# Patient Record
Sex: FEMALE | Race: WHITE | NOT HISPANIC OR LATINO | Employment: FULL TIME | URBAN - METROPOLITAN AREA
[De-identification: names, ages, dates, MRNs, and addresses within clinical notes are randomized per-mention and may not be internally consistent; named-entity substitution may affect disease eponyms.]

---

## 2022-07-28 PROBLEM — S73.191A ACETABULAR LABRUM TEAR, RIGHT, INITIAL ENCOUNTER: Status: ACTIVE | Noted: 2022-07-28

## 2022-08-22 PROBLEM — M54.16 LUMBAR RADICULOPATHY: Status: ACTIVE | Noted: 2022-08-22

## 2022-09-07 ENCOUNTER — OFFICE VISIT (OUTPATIENT)
Dept: OBSTETRICS AND GYNECOLOGY | Facility: CLINIC | Age: 52
End: 2022-09-07
Payer: COMMERCIAL

## 2022-09-07 VITALS
WEIGHT: 127.75 LBS | HEART RATE: 91 BPM | BODY MASS INDEX: 22.64 KG/M2 | DIASTOLIC BLOOD PRESSURE: 84 MMHG | HEIGHT: 63 IN | SYSTOLIC BLOOD PRESSURE: 149 MMHG

## 2022-09-07 DIAGNOSIS — Z01.419 ROUTINE GYNECOLOGICAL EXAMINATION: Primary | ICD-10-CM

## 2022-09-07 DIAGNOSIS — R23.2 HOT FLASHES: ICD-10-CM

## 2022-09-07 PROCEDURE — 99386 PREV VISIT NEW AGE 40-64: CPT | Mod: S$GLB,,, | Performed by: OBSTETRICS & GYNECOLOGY

## 2022-09-07 PROCEDURE — 99386 PR PREVENTIVE VISIT,NEW,40-64: ICD-10-PCS | Mod: S$GLB,,, | Performed by: OBSTETRICS & GYNECOLOGY

## 2022-09-07 RX ORDER — ESTRADIOL 0.1 MG/D
1 FILM, EXTENDED RELEASE TRANSDERMAL
Qty: 24 PATCH | Refills: 4 | Status: SHIPPED | OUTPATIENT
Start: 2022-09-08 | End: 2023-01-25 | Stop reason: SDUPTHER

## 2022-09-07 RX ORDER — PROGESTERONE 100 MG/1
100 CAPSULE ORAL NIGHTLY
Qty: 12 CAPSULE | Refills: 11 | Status: SHIPPED | OUTPATIENT
Start: 2022-09-07 | End: 2022-09-23 | Stop reason: SDUPTHER

## 2022-09-07 NOTE — PROGRESS NOTES
Answers submitted by the patient for this visit:  Gynecologic Exam Questionnaire  (Submitted on 2022)  Chief Complaint: Gynecologic exam  genital itching: No  genital lesions: No  genital odor: No  genital rash: No  missed menses: No  pelvic pain: No  vaginal bleeding: No  vaginal discharge: No  Pregnant now?: No  abdominal pain: No  anorexia: No  back pain: No  chills: No  constipation: No  diarrhea: No  discolored urine: No  dysuria: No  fever: No  flank pain: No  frequency: No  headaches: No  hematuria: No  nausea: No  painful intercourse: No  rash: No  urgency: No  vomiting: No  Vaginal bleeding: no bleeding  Passing clots?: No  Passing tissue?: No  Sexual activity: sexually active  Partner with STD symptoms: no  Menstrual history: postmenopausal  STD: No  abdominal surgery: Yes   section: No  Ectopic pregnancy: No  Endometriosis: Yes  herpes simplex: No  gynecological surgery: Yes  menorrhagia: Yes  metrorrhagia: No  miscarriage: Yes  ovarian cysts: Yes  perineal abscess: No  PID: Yes  terminated pregnancy: Yes  vaginosis: No

## 2022-09-07 NOTE — PROGRESS NOTES
Subjective:       Patient ID: Kassandra Crisostomo is a 52 y.o. female.    Chief Complaint:  Well Woman (Pt denies any complaints/)      History of Present Illness  Gynecologic Exam  The patient's pertinent negatives include no vaginal discharge. Pertinent negatives include no abdominal pain, back pain, constipation, diarrhea, dysuria, fever or headaches.   Patient with hot flashes through out the day and then at night.   Started having hot flashes 8 years ago at 44, after bilateral tubal ligation.  Patient has tried estradiol 0.05, then 0.075  Also medroxyprogesterone 5mg  Started bleeding on hormones  Having pain in joints as well.     GYN & OB History  No LMP recorded. Patient is postmenopausal.   Date of Last Pap: No result found    OB History    Para Term  AB Living   4 3           SAB IAB Ectopic Multiple Live Births                  # Outcome Date GA Lbr Jin/2nd Weight Sex Delivery Anes PTL Lv   4             3 Para            2 Para            1 Para                Review of Systems  Review of Systems   Constitutional:  Negative for activity change, appetite change, fatigue, fever and unexpected weight change.   HENT:  Negative for nasal congestion and tinnitus.    Eyes:  Negative for visual disturbance.   Respiratory:  Negative for cough and shortness of breath.    Cardiovascular:  Negative for chest pain and leg swelling.   Gastrointestinal:  Negative for abdominal pain, bloating, blood in stool, constipation and diarrhea.   Endocrine: Positive for hot flashes.   Genitourinary:  Positive for menstrual problem. Negative for bladder incontinence, decreased libido, dysuria, vaginal bleeding, vaginal discharge, vaginal pain, vaginal dryness and vaginal odor.   Musculoskeletal:  Negative for arthralgias, back pain and joint swelling.   Integumentary:  Negative for acne.   Neurological:  Negative for headaches.   Psychiatric/Behavioral:  Negative for depression and sleep disturbance. The patient is  not nervous/anxious.          Objective:    Physical Exam:   Constitutional: She is oriented to person, place, and time. Vital signs are normal. She appears well-developed and well-nourished. She is cooperative.      Neck: No thyroid mass and no thyromegaly present.    Cardiovascular:  Normal rate, regular rhythm and normal pulses.             Pulmonary/Chest: Effort normal. No respiratory distress. Chest wall is not dull to percussion. She exhibits no mass, no bony tenderness, no laceration, no crepitus, no edema, no deformity, no swelling and no retraction. Right breast exhibits no inverted nipple, no mass, no nipple discharge, no skin change, no tenderness, presence, no bleeding and no swelling. Left breast exhibits no inverted nipple, no mass, no nipple discharge, no skin change, no tenderness, presence, no bleeding and no swelling.        Abdominal: Soft. She exhibits no distension. There is no abdominal tenderness. No hernia.     Genitourinary:    Vagina, uterus and rectum normal.      Pelvic exam was performed with patient supine.   Labial bartholins normal.There is no rash, tenderness, lesion or injury on the right labia. There is no rash, tenderness, lesion or injury on the left labia. Cervix is normal. Right adnexum displays no mass, no tenderness and no fullness. Left adnexum displays no mass, no tenderness and no fullness. No  no vaginal discharge, rectocele, cystocele or unspecified prolapse of vaginal walls in the vagina.           Musculoskeletal: Moves all extremeties.       Neurological: She is alert and oriented to person, place, and time.    Skin: Skin is warm and dry. No rash noted.    Psychiatric: She has a normal mood and affect. Her speech is normal and behavior is normal. Judgment and thought content normal.        Assessment:     Menopause  Hot Flashes  Well Woman Exam        Plan:      Routine care   Plan increased doses of estrogen.  Patient had decreased approximately <5 cig/day.  Discussed increased risk with smoking use and use of estrogen. Patient is working diligently on smoking cessation.

## 2022-09-14 LAB — Lab: NORMAL

## 2022-09-23 ENCOUNTER — PATIENT MESSAGE (OUTPATIENT)
Dept: OBSTETRICS AND GYNECOLOGY | Facility: CLINIC | Age: 52
End: 2022-09-23
Payer: COMMERCIAL

## 2022-09-23 RX ORDER — PROGESTERONE 100 MG/1
100 CAPSULE ORAL NIGHTLY
Qty: 90 CAPSULE | Refills: 3 | Status: SHIPPED | OUTPATIENT
Start: 2022-09-23 | End: 2023-08-31 | Stop reason: SDUPTHER

## 2022-10-20 ENCOUNTER — OFFICE VISIT (OUTPATIENT)
Dept: CARDIOTHORACIC SURGERY | Facility: CLINIC | Age: 52
End: 2022-10-20
Payer: COMMERCIAL

## 2022-10-20 VITALS
BODY MASS INDEX: 22.5 KG/M2 | OXYGEN SATURATION: 97 % | WEIGHT: 127 LBS | HEIGHT: 63 IN | RESPIRATION RATE: 16 BRPM | DIASTOLIC BLOOD PRESSURE: 84 MMHG | HEART RATE: 87 BPM | SYSTOLIC BLOOD PRESSURE: 160 MMHG

## 2022-10-20 DIAGNOSIS — I74.5 ILIAC ARTERY OCCLUSION, RIGHT: Primary | ICD-10-CM

## 2022-10-20 PROCEDURE — 99205 OFFICE O/P NEW HI 60 MIN: CPT | Mod: S$GLB,,, | Performed by: SURGERY

## 2022-10-20 PROCEDURE — 99205 PR OFFICE/OUTPT VISIT, NEW, LEVL V, 60-74 MIN: ICD-10-PCS | Mod: S$GLB,,, | Performed by: SURGERY

## 2022-10-20 RX ORDER — LISINOPRIL AND HYDROCHLOROTHIAZIDE 12.5; 2 MG/1; MG/1
TABLET ORAL
COMMUNITY
Start: 2022-10-06

## 2022-10-20 RX ORDER — QUETIAPINE FUMARATE 50 MG/1
50 TABLET, FILM COATED ORAL
COMMUNITY
Start: 2022-09-27 | End: 2023-10-11

## 2022-10-20 RX ORDER — DEXTROAMPHETAMINE SACCHARATE, AMPHETAMINE ASPARTATE, DEXTROAMPHETAMINE SULFATE AND AMPHETAMINE SULFATE 3.75; 3.75; 3.75; 3.75 MG/1; MG/1; MG/1; MG/1
TABLET ORAL
COMMUNITY

## 2022-10-20 RX ORDER — BUDESONIDE, GLYCOPYRROLATE, AND FORMOTEROL FUMARATE 160; 9; 4.8 UG/1; UG/1; UG/1
AEROSOL, METERED RESPIRATORY (INHALATION)
COMMUNITY
Start: 2022-07-26 | End: 2023-10-11

## 2022-10-20 NOTE — PROGRESS NOTES
..  Subjective:      Patient ID: Kassandra Crisostomo is a 52 y.o. female who presents for evaluation of right lower extremity claudication. Referred from Dr. [facility]un[facility].     Chief Complaint:   Right lower extremity claudication     HPI  53 yo lady with hx of HTN and tobacco use presents with chronic right lower extremity claudication. She reports she feels a heaviness in her right leg after she walks approximately 200 yards. She denies any rest pain or wounds on the feet. She is still actively smoking.         Review of Systems   Cardiovascular:  Positive for claudication. Negative for chest pain.   Respiratory:  Negative for shortness of breath.     Past Medical History:   Diagnosis Date    Breast disorder 09/07/2021    Hormone disorder     Hypertension     Insomnia       Past Surgical History:   Procedure Laterality Date    APPENDECTOMY      RETINAL DETACHMENT SURGERY Bilateral 1991    salpingectomy Bilateral 2014      Family History   Problem Relation Age of Onset    Heart disease Mother     Diabetes Mellitus Mother       Social History     Socioeconomic History    Marital status:    Tobacco Use    Smoking status: Every Day     Packs/day: 0.25     Years: 30.00     Pack years: 7.50     Types: Cigarettes    Smokeless tobacco: Never   Substance and Sexual Activity    Alcohol use: Yes    Drug use: Never    Sexual activity: Yes     Partners: Male     Birth control/protection: See Surgical Hx        Medication List with Changes/Refills   Current Medications    BREZTRI AEROSPHERE 160-9-4.8 MCG/ACTUATION HFAA        DEXTROAMPHETAMINE-AMPHETAMINE (ADDERALL) 15 MG TABLET    Take by mouth. 1/2 tablet BID    ESTRADIOL (VIVELLE-DOT) 0.1 MG/24 HR PTSW    Place 1 patch onto the skin twice a week.    LISINOPRIL-HYDROCHLOROTHIAZIDE (PRINZIDE,ZESTORETIC) 20-12.5 MG PER TABLET        PROGESTERONE (PROMETRIUM) 100 MG CAPSULE    Take 1 capsule (100 mg total) by mouth nightly.    QUETIAPINE (SEROQUEL) 50 MG TABLET    50 mg.     "    Objective:     BP (!) 160/84   Pulse 87   Resp 16   Ht 5' 3" (1.6 m)   Wt 57.6 kg (127 lb)   SpO2 97%   BMI 22.50 kg/m²     Physical Exam  Constitutional:       Appearance: Normal appearance. She is normal weight.   Cardiovascular:      Rate and Rhythm: Normal rate and regular rhythm.      Comments: Monophasic R DP pulse  Multiphasic L DP pulse   Pulmonary:      Effort: Pulmonary effort is normal.   Abdominal:      General: Bowel sounds are normal. There is no distension.      Tenderness: There is no abdominal tenderness.        Labs:          Assessment & Plan:     53 yo F with right common iliac occlusion in the setting of right lower extremity claudication. Discussed with the patient the pathophysiology of claudication and that it is neither a life threatening nor limb threatening problem.     I discussed that there are three options of management of this - 1) Non-operative; optimize medical management - smoking cessation and anti-platelet therapy and walking problem - benefit of this approach is avoid the risks of major operation; downside is that this claudication will persist for a period of time until she develops collaterals furthermore it may worsen if she continues to smoke. 2) Endovascular - not ideal in someone of her young age and also given the significantly calcified lesion it will likely not be as amenable to stenting. 3) Open - ideal solution given lesion morphology and patient but would entail aorto-iliac endarterectomy. Discussed that it would be a major operation that would in total be an approximately 2 month recovery period with approximately 1 week hospital stay.     At this time, given that her  could be undergoing a kidney transplant soon, she would like to try non-operative management and see if she can have an improvement in her symptoms. This would enable her also to care for her  in the event that he goes for a kidney transplant soon.     Will plan to see the " patient back in 1 month with ABIs an Arterial Duplex.

## 2022-11-29 ENCOUNTER — OFFICE VISIT (OUTPATIENT)
Dept: CARDIOTHORACIC SURGERY | Facility: CLINIC | Age: 52
End: 2022-11-29
Payer: COMMERCIAL

## 2022-11-29 VITALS
HEART RATE: 79 BPM | DIASTOLIC BLOOD PRESSURE: 79 MMHG | HEIGHT: 63 IN | SYSTOLIC BLOOD PRESSURE: 121 MMHG | OXYGEN SATURATION: 96 % | RESPIRATION RATE: 16 BRPM | WEIGHT: 128.38 LBS | BODY MASS INDEX: 22.75 KG/M2

## 2022-11-29 DIAGNOSIS — F17.218 CIGARETTE NICOTINE DEPENDENCE WITH OTHER NICOTINE-INDUCED DISORDER: ICD-10-CM

## 2022-11-29 DIAGNOSIS — I73.9 PERIPHERAL ARTERIAL DISEASE: Primary | ICD-10-CM

## 2022-11-29 PROCEDURE — 99212 PR OFFICE/OUTPT VISIT, EST, LEVL II, 10-19 MIN: ICD-10-PCS | Mod: S$GLB,,, | Performed by: SURGERY

## 2022-11-29 PROCEDURE — 99212 OFFICE O/P EST SF 10 MIN: CPT | Mod: S$GLB,,, | Performed by: SURGERY

## 2022-11-30 NOTE — PROGRESS NOTES
"..  Subjective:      Patient ID: Kassandra Crisostomo is a 52 y.o. female who presents for evaluation of right lower extremity claudication in the setting of aorto-iliac disease.     Chief Complaint: Right lower extremity claudication.     HPI  53 yo F presents for evaluation of right lower extremity claudication in the setting of aorto-iliac disease. She reports right leg heaviness after walking approximately 3000 feet. She denies any rest pain .    ROS   Past Medical History:   Diagnosis Date    Breast disorder 09/07/2021    Emphysema lung     Hormone disorder     Hypertension     Insomnia       Past Surgical History:   Procedure Laterality Date    APPENDECTOMY      RETINAL DETACHMENT SURGERY Bilateral 1991    salpingectomy Bilateral 2014      Family History   Problem Relation Age of Onset    Heart disease Mother     Diabetes Mellitus Mother       Social History     Socioeconomic History    Marital status:    Tobacco Use    Smoking status: Every Day     Packs/day: 0.25     Years: 30.00     Pack years: 7.50     Types: Cigarettes    Smokeless tobacco: Never   Substance and Sexual Activity    Alcohol use: Yes    Drug use: Never    Sexual activity: Yes     Partners: Male     Birth control/protection: See Surgical Hx        Medication List with Changes/Refills   Current Medications    BREZTRI AEROSPHERE 160-9-4.8 MCG/ACTUATION HFAA        DEXTROAMPHETAMINE-AMPHETAMINE (ADDERALL) 15 MG TABLET    Take by mouth. 1/2 tablet BID    ESTRADIOL (VIVELLE-DOT) 0.1 MG/24 HR PTSW    Place 1 patch onto the skin twice a week.    LISINOPRIL-HYDROCHLOROTHIAZIDE (PRINZIDE,ZESTORETIC) 20-12.5 MG PER TABLET        PROGESTERONE (PROMETRIUM) 100 MG CAPSULE    Take 1 capsule (100 mg total) by mouth nightly.    QUETIAPINE (SEROQUEL) 50 MG TABLET    50 mg.        Objective:     /79 (BP Location: Left arm, Patient Position: Sitting)   Pulse 79   Resp 16   Ht 5' 3" (1.6 m)   Wt 58.2 kg (128 lb 6.4 oz)   SpO2 96%   BMI 22.75 kg/m² "   Gen: Well-appearing; no acute distress  Lungs: Unlabored respirations on room air  Cardio: Regular rate and rhythm     Lower extremities: Non-palpable R DP, PT signals; + doppler signals           Assessment & Plan:   51 yo F presents for evaluation of right lower extremity claudication in the setting of aorto-iliac disease. She reports right leg heaviness after walking approximately 3000 feet. She denies any rest pain.     - Continue with smoking cessation   - Referral to Ambulatory smoking services  - Continue best medical therapy - aspirin, statin; blood pressure management (SBP < 140; DBP < 80)  - Follow-up in last few weeks of January after determining kidney transplant status of her

## 2023-01-17 ENCOUNTER — CLINICAL SUPPORT (OUTPATIENT)
Dept: SMOKING CESSATION | Facility: CLINIC | Age: 53
End: 2023-01-17

## 2023-01-17 DIAGNOSIS — F17.210 MODERATE SMOKER (20 OR LESS PER DAY): Primary | ICD-10-CM

## 2023-01-17 PROCEDURE — 99404 PREV MED CNSL INDIV APPRX 60: CPT | Mod: 95,,,

## 2023-01-17 PROCEDURE — 99404 PR PREVENT COUNSEL,INDIV,60 MIN: ICD-10-PCS | Mod: 95,,,

## 2023-01-17 RX ORDER — IBUPROFEN 200 MG
1 TABLET ORAL DAILY
Qty: 14 PATCH | Refills: 0 | Status: SHIPPED | OUTPATIENT
Start: 2023-01-17 | End: 2023-01-17

## 2023-01-17 RX ORDER — DM/P-EPHED/ACETAMINOPH/DOXYLAM 30-7.5/3
LIQUID (ML) ORAL
Qty: 270 LOZENGE | Refills: 0 | Status: SHIPPED | OUTPATIENT
Start: 2023-01-17 | End: 2023-01-17

## 2023-01-17 RX ORDER — DM/P-EPHED/ACETAMINOPH/DOXYLAM 30-7.5/3
LIQUID (ML) ORAL
Qty: 270 LOZENGE | Refills: 0 | Status: SHIPPED | OUTPATIENT
Start: 2023-01-17 | End: 2023-01-24

## 2023-01-17 RX ORDER — IBUPROFEN 200 MG
1 TABLET ORAL DAILY
Qty: 14 PATCH | Refills: 0 | Status: SHIPPED | OUTPATIENT
Start: 2023-01-17 | End: 2023-01-24

## 2023-01-17 NOTE — Clinical Note
Patient currently smoking a pack per day and will begin 1:1 cessation therapy with CTTS. Patient will begin prescribed tobacco cessation medication regimen of 21mg nicotine patch, along with 2mg nicotine lozenge, daily as directed.

## 2023-01-18 NOTE — PROGRESS NOTES
Patient currently smoking a pack per day and will begin 1:1 cessation therapy with CTTS. Patient will begin prescribed tobacco cessation medication regimen of 21mg nicotine patch, along with 2mg nicotine lozenge, daily as directed.    The patient location is: [place], [place]  The chief complaint leading to consultation is: Nicotine dependence    Visit type: audiovisual    Face to Face time with patient: 46  30 minutes of total time spent on the encounter, which includes face to face time and non-face to face time preparing to see the patient (eg, review of tests), Obtaining and/or reviewing separately obtained history, Documenting clinical information in the electronic or other health record, Independently interpreting results (not separately reported) and communicating results to the patient/family/caregiver, or Care coordination (not separately reported).         Each patient to whom he or she provides medical services by telemedicine is:  (1) informed of the relationship between the physician and patient and the respective role of any other health care provider with respect to management of the patient; and (2) notified that he or she may decline to receive medical services by telemedicine and may withdraw from such care at any time.    Notes:

## 2023-01-25 RX ORDER — ESTRADIOL 0.1 MG/D
1 FILM, EXTENDED RELEASE TRANSDERMAL
Qty: 72 PATCH | Refills: 4 | Status: SHIPPED | OUTPATIENT
Start: 2023-01-26 | End: 2023-10-11

## 2023-01-31 ENCOUNTER — CLINICAL SUPPORT (OUTPATIENT)
Dept: SMOKING CESSATION | Facility: CLINIC | Age: 53
End: 2023-01-31

## 2023-01-31 DIAGNOSIS — F17.210 LIGHT CIGARETTE SMOKER (1-9 CIGS/DAY): Primary | ICD-10-CM

## 2023-01-31 DIAGNOSIS — F17.210 MODERATE SMOKER (20 OR LESS PER DAY): ICD-10-CM

## 2023-01-31 PROCEDURE — 99403 PR PREVENT COUNSEL,INDIV,45 MIN: ICD-10-PCS | Mod: 95,,,

## 2023-01-31 PROCEDURE — 99403 PREV MED CNSL INDIV APPRX 45: CPT | Mod: 95,,,

## 2023-01-31 RX ORDER — IBUPROFEN 200 MG
1 TABLET ORAL DAILY
Qty: 14 PATCH | Refills: 0 | Status: SHIPPED | OUTPATIENT
Start: 2023-01-31 | End: 2023-02-28 | Stop reason: SDUPTHER

## 2023-01-31 RX ORDER — DM/P-EPHED/ACETAMINOPH/DOXYLAM 30-7.5/3
LIQUID (ML) ORAL
Qty: 216 LOZENGE | Refills: 0 | Status: SHIPPED | OUTPATIENT
Start: 2023-01-31

## 2023-01-31 NOTE — Clinical Note
10 cig/day, down from a pack/day; pt remains on prescribed tobacco cessation medication regimen of 21mg nicotine patch, along with 2mg nicotine lozenge (about 3/day), daily as directed; pt has done well wearing patches and was unsure if to wear them overnight, advised pt to wear full 24hrs if no side effects to help with craving control in the AM when waking; discussed with pt habit breaking behaviors and redirecting thoughts of smoking, encouraged pt to keep specific game or book to play/read only when avoiding smoking to use as a reward for choosing not to smoke; discussed recommended books and phone apps to use as guidance during the quitting process; commended pt on progress this far and discussed next steps in quit plan

## 2023-01-31 NOTE — PROGRESS NOTES
Individual Follow-Up Form    1/31/2023    Quit Date:     Clinical Status of Patient: Outpatient    Length of Service: 30 minutes    Continuing Medication: yes  Patches or Nicotine Lozenges    Other Medications:      Target Symptoms: Withdrawal and medication side effects. The following were  rated moderate (3) to severe (4) on TCRS:  Moderate (3): none  Severe (4): none    Comments: 10 cig/day, down from a pack/day; pt remains on prescribed tobacco cessation medication regimen of 21mg nicotine patch, along with 2mg nicotine lozenge (about 3/day), daily as directed; pt has done well wearing patches and was unsure if to wear them overnight, advised pt to wear full 24hrs if no side effects to help with craving control in the AM when waking; discussed with pt habit breaking behaviors and redirecting thoughts of smoking, encouraged pt to keep specific game or book to play/read only when avoiding smoking to use as a reward for choosing not to smoke; discussed recommended books and phone apps to use as guidance during the quitting process; commended pt on progress this far and discussed next steps in quit plan    Diagnosis: F17.210    Next Visit: 2 weeks    The patient location is: [place], [place]  The chief complaint leading to consultation is: Nicotine dependence     Visit type: audiovisual     Face to Face time with patient: 24  36 minutes of total time spent on the encounter, which includes face to face time and non-face to face time preparing to see the patient (eg, review of tests), Obtaining and/or reviewing separately obtained history, Documenting clinical information in the electronic or other health record, Independently interpreting results (not separately reported) and communicating results to the patient/family/caregiver, or Care coordination (not separately reported).            Each patient to whom he or she provides medical services by telemedicine is:  (1) informed of the relationship between the physician and  patient and the respective role of any other health care provider with respect to management of the patient; and (2) notified that he or she may decline to receive medical services by telemedicine and may withdraw from such care at any time.

## 2023-02-14 ENCOUNTER — PATIENT MESSAGE (OUTPATIENT)
Dept: SMOKING CESSATION | Facility: CLINIC | Age: 53
End: 2023-02-14
Payer: COMMERCIAL

## 2023-02-14 ENCOUNTER — CLINICAL SUPPORT (OUTPATIENT)
Dept: SMOKING CESSATION | Facility: CLINIC | Age: 53
End: 2023-02-14
Payer: COMMERCIAL

## 2023-02-14 DIAGNOSIS — F17.210 MODERATE CIGARETTE SMOKER (10-19 PER DAY): Primary | ICD-10-CM

## 2023-02-14 PROCEDURE — 99403 PR PREVENT COUNSEL,INDIV,45 MIN: ICD-10-PCS | Mod: 95,,,

## 2023-02-14 PROCEDURE — 99403 PREV MED CNSL INDIV APPRX 45: CPT | Mod: 95,,,

## 2023-02-14 NOTE — Clinical Note
13-15 cig/day, down from a pack/day; pt remains on prescribed tobacco cessation medication regimen of 21mg nicotine patch, along with 2mg nicotine lozenge (about 3/day), daily as directed; pt has identified her phone as one of her biggest stressors, discussed stress management and relaxation techniques, pt will begin practicing deep breathing exercises several times/day; pt is enjoying the journaling and feels she it is helpful in redirecting her thoughts; pt has gone several mornings without smoking prior to breakfast, where she used to have a cigarette immediately upon waking, pt has also been able to run a couple errands without bringing cigarettes with her; commended pt on progress this far and discussed next steps in quit plan

## 2023-02-14 NOTE — PROGRESS NOTES
Individual Follow-Up Form    2/14/2023    Quit Date:     Clinical Status of Patient: Outpatient    Length of Service: 30 minutes    Continuing Medication: yes  Patches or Nicotine Lozenges    Other Medications:      Target Symptoms: Withdrawal and medication side effects. The following were  rated moderate (3) to severe (4) on TCRS:  Moderate (3): none  Severe (4): none    Comments: 13-15 cig/day, down from a pack/day; pt remains on prescribed tobacco cessation medication regimen of 21mg nicotine patch, along with 2mg nicotine lozenge (about 3/day), daily as directed; pt has identified her phone as one of her biggest stressors, discussed stress management and relaxation techniques, pt will begin practicing deep breathing exercises several times/day; pt is enjoying the journaling and feels she it is helpful in redirecting her thoughts; pt has gone several mornings without smoking prior to breakfast, where she used to have a cigarette immediately upon waking, pt has also been able to run a couple errands without bringing cigarettes with her; commended pt on progress this far and discussed next steps in quit plan        Diagnosis: F17.210    Next Visit: 2 weeks    The patient location is: [place], [place]  The chief complaint leading to consultation is: Nicotine dependence     Visit type: audiovisual     Face to Face time with patient: 24  36 minutes of total time spent on the encounter, which includes face to face time and non-face to face time preparing to see the patient (eg, review of tests), Obtaining and/or reviewing separately obtained history, Documenting clinical information in the electronic or other health record, Independently interpreting results (not separately reported) and communicating results to the patient/family/caregiver, or Care coordination (not separately reported).            Each patient to whom he or she provides medical services by telemedicine is:  (1) informed of the relationship between the  physician and patient and the respective role of any other health care provider with respect to management of the patient; and (2) notified that he or she may decline to receive medical services by telemedicine and may withdraw from such care at any time.

## 2023-02-28 ENCOUNTER — CLINICAL SUPPORT (OUTPATIENT)
Dept: SMOKING CESSATION | Facility: CLINIC | Age: 53
End: 2023-02-28

## 2023-02-28 DIAGNOSIS — F17.210 MODERATE CIGARETTE SMOKER (10-19 PER DAY): Primary | ICD-10-CM

## 2023-02-28 DIAGNOSIS — F17.210 MODERATE SMOKER (20 OR LESS PER DAY): ICD-10-CM

## 2023-02-28 PROCEDURE — 99403 PREV MED CNSL INDIV APPRX 45: CPT | Mod: 95,,,

## 2023-02-28 PROCEDURE — 99403 PR PREVENT COUNSEL,INDIV,45 MIN: ICD-10-PCS | Mod: 95,,,

## 2023-02-28 RX ORDER — IBUPROFEN 200 MG
1 TABLET ORAL DAILY
Qty: 14 PATCH | Refills: 0 | Status: SHIPPED | OUTPATIENT
Start: 2023-02-28 | End: 2023-04-05 | Stop reason: SDUPTHER

## 2023-02-28 NOTE — PROGRESS NOTES
Individual Follow-Up Form    2/28/2023    Quit Date:     Clinical Status of Patient: Outpatient    Length of Service: 30 minutes    Continuing Medication: yes  Patches or Nicotine Lozenges    Other Medications:      Target Symptoms: Withdrawal and medication side effects. The following were  rated moderate (3) to severe (4) on TCRS:  Moderate (3): none  Severe (4): none    Comments: 11-16 cig/day, down from a pack/day; pt remains on prescribed tobacco cessation medication regimen of 21mg nicotine patch, along with 2mg nicotine lozenge (about 3/day), daily as directed; reviewed stress management and relaxation techniques, encouraged pt to continue practicing deep breathing exercises several times/day, even though she sometime feels it isn't helping with the stress; pt continues to go longer in the mornings without smoking prior to breakfast, where she used to have a cigarette immediately upon waking, and has also been able to run more and longer errands without bringing cigarettes with her; pt had a few days where she went off track and smoked a bit more, but has been able to get herself back on track as of Monday; pt reports using some older patches that may have not been potent enough (exp in 2021); discussed ways to increase lozenge use, pt will keep bottle next to pack of cigarettes to allow for making the choice to smoke or choose the lozenge; commended pt on progress this far and discussed next steps in quit plan      Diagnosis: F17.210    Next Visit: 2 weeks    The patient location is: [place], [place]  The chief complaint leading to consultation is: Nicotine dependence     Visit type: audiovisual     Face to Face time with patient: 28  38 minutes of total time spent on the encounter, which includes face to face time and non-face to face time preparing to see the patient (eg, review of tests), Obtaining and/or reviewing separately obtained history, Documenting clinical information in the electronic or other health  record, Independently interpreting results (not separately reported) and communicating results to the patient/family/caregiver, or Care coordination (not separately reported).            Each patient to whom he or she provides medical services by telemedicine is:  (1) informed of the relationship between the physician and patient and the respective role of any other health care provider with respect to management of the patient; and (2) notified that he or she may decline to receive medical services by telemedicine and may withdraw from such care at any time.

## 2023-02-28 NOTE — Clinical Note
11-16 cig/day, down from pack/day; pt remains on prescribed tobacco cessation medication regimen of 21mg nicotine patch, along with 2mg nicotine lozenge (about 3/day), daily as directed; reviewed stress management and relaxation techniques, encouraged pt to continue practicing deep breathing exercises several times/day, even though she sometimes feels it isn't helping with the stress; pt continues to go longer in the mornings without smoking prior to breakfast, where she used to have a cigarette immediately upon waking, and has also been able to run more and longer errands without bringing cigarettes with her; pt had a few days where she smoked a bit more, but has been able to get herself back on track; pt reports using some older patches that may have not been potent enough (exp in 2021); discussed ways to increase lozenge use, pt will keep bottle next to pack of cig to allow for making the choice to smoke or choose the lozenge; commended pt on progress this far and discussed next steps in quit plan

## 2023-03-23 ENCOUNTER — CLINICAL SUPPORT (OUTPATIENT)
Dept: SMOKING CESSATION | Facility: CLINIC | Age: 53
End: 2023-03-23
Payer: COMMERCIAL

## 2023-03-23 DIAGNOSIS — F17.210 MODERATE CIGARETTE SMOKER (10-19 PER DAY): Primary | ICD-10-CM

## 2023-03-23 PROCEDURE — 99402 PREV MED CNSL INDIV APPRX 30: CPT | Mod: 95,,,

## 2023-03-23 PROCEDURE — 99402 PR PREVENT COUNSEL,INDIV,30 MIN: ICD-10-PCS | Mod: 95,,,

## 2023-03-23 RX ORDER — IBUPROFEN 200 MG
1 TABLET ORAL DAILY
Qty: 14 PATCH | Refills: 0 | Status: SHIPPED | OUTPATIENT
Start: 2023-03-23 | End: 2023-03-27

## 2023-03-23 NOTE — Clinical Note
10-14 cig/day, down from a pack/day; pt remains on prescribed tobacco cessation medication regimen of 14mg nicotine patch (decreasing from 21mg), along with 2mg nicotine lozenge (about 3/day), daily as directed; pt has successfully eliminated the before breakfast cigarette, and is no longer smoking in the car; couple days smoked a bit more than 14 (one day 15, one day 16) but has been able to keep herself focused and gotten back on track; after food is still a big trigger, pt has been using tic tacs at her desk to help; pt has set goal to only keep 8-9 cig in her current pack and to also get back to keeping accurate daily count; pt will begin reading a chapter in her book as a reward for completing a project instead of smoking or snacking; pt will continue to keep bottle of lozenges next to pack of cigarettes to allow for making the choice to smoke or choose the lozenge; commended pt on progress this far and discussed next steps in quit plan

## 2023-03-23 NOTE — PROGRESS NOTES
Individual Follow-Up Form    3/23/2023    Quit Date:     Clinical Status of Patient: Outpatient    Length of Service: 30 minutes    Continuing Medication: yes  Patches or Nicotine Lozenges    Other Medications:      Target Symptoms: Withdrawal and medication side effects. The following were  rated moderate (3) to severe (4) on TCRS:  Moderate (3): none  Severe (4): none    Comments: 10-14 cig/day, down from a pack/day; pt remains on prescribed tobacco cessation medication regimen of 14mg nicotine patch (decreasing from 21mg), along with 2mg nicotine lozenge (about 3/day), daily as directed; pt has successfully eliminated the before breakfast cigarette, and is no longer smoking in the car; couple days smoked a bit more than 14 (one day 15, one day 16) but has been able to keep herself focused and gotten back on track; after food is still a big trigger, pt has been using tic tacs at her desk to help; pt has set goal to only keep 8-9 cig in her current pack and to also get back to keeping accurate daily count; pt will begin reading a chapter in her book as a reward for completing a project instead of smoking or snacking; pt will continue to keep bottle of lozenges next to pack of cigarettes to allow for making the choice to smoke or choose the lozenge; commended pt on progress this far and discussed next steps in quit plan      Diagnosis: F17.210    Next Visit: 2 weeks    The patient location is: [place], [place]  The chief complaint leading to consultation is: Nicotine dependence     Visit type: audiovisual     Face to Face time with patient: 18  28 minutes of total time spent on the encounter, which includes face to face time and non-face to face time preparing to see the patient (eg, review of tests), Obtaining and/or reviewing separately obtained history, Documenting clinical information in the electronic or other health record, Independently interpreting results (not separately reported) and communicating results to  the patient/family/caregiver, or Care coordination (not separately reported).            Each patient to whom he or she provides medical services by telemedicine is:  (1) informed of the relationship between the physician and patient and the respective role of any other health care provider with respect to management of the patient; and (2) notified that he or she may decline to receive medical services by telemedicine and may withdraw from such care at any time.

## 2023-03-27 ENCOUNTER — PATIENT MESSAGE (OUTPATIENT)
Dept: SMOKING CESSATION | Facility: CLINIC | Age: 53
End: 2023-03-27
Payer: COMMERCIAL

## 2023-03-27 ENCOUNTER — TELEPHONE (OUTPATIENT)
Dept: SMOKING CESSATION | Facility: CLINIC | Age: 53
End: 2023-03-27
Payer: COMMERCIAL

## 2023-03-27 DIAGNOSIS — F17.210 MODERATE CIGARETTE SMOKER (10-19 PER DAY): ICD-10-CM

## 2023-03-27 RX ORDER — IBUPROFEN 200 MG
1 TABLET ORAL DAILY
Qty: 14 PATCH | Refills: 0 | Status: SHIPPED | OUTPATIENT
Start: 2023-03-27 | End: 2023-04-19 | Stop reason: SDUPTHER

## 2023-03-27 NOTE — TELEPHONE ENCOUNTER
Pt called stated her current mail order pharmacy does not show her recent refill being submitted and pt states she is completely out of patches; requested refill sent to local pharmacy to  today; refill sent to requested pharmacy

## 2023-04-03 ENCOUNTER — PATIENT MESSAGE (OUTPATIENT)
Dept: SMOKING CESSATION | Facility: CLINIC | Age: 53
End: 2023-04-03
Payer: COMMERCIAL

## 2023-04-04 ENCOUNTER — PATIENT MESSAGE (OUTPATIENT)
Dept: SMOKING CESSATION | Facility: CLINIC | Age: 53
End: 2023-04-04
Payer: COMMERCIAL

## 2023-04-05 ENCOUNTER — CLINICAL SUPPORT (OUTPATIENT)
Dept: SMOKING CESSATION | Facility: CLINIC | Age: 53
End: 2023-04-05

## 2023-04-05 DIAGNOSIS — F17.210 MODERATE SMOKER (20 OR LESS PER DAY): ICD-10-CM

## 2023-04-05 DIAGNOSIS — F17.210 MODERATE CIGARETTE SMOKER (10-19 PER DAY): Primary | ICD-10-CM

## 2023-04-05 PROCEDURE — 99402 PREV MED CNSL INDIV APPRX 30: CPT | Mod: 95,,,

## 2023-04-05 PROCEDURE — 99402 PR PREVENT COUNSEL,INDIV,30 MIN: ICD-10-PCS | Mod: 95,,,

## 2023-04-05 RX ORDER — IBUPROFEN 200 MG
1 TABLET ORAL DAILY
Qty: 14 PATCH | Refills: 0 | Status: SHIPPED | OUTPATIENT
Start: 2023-04-05 | End: 2023-04-19 | Stop reason: SDUPTHER

## 2023-04-05 NOTE — PROGRESS NOTES
Individual Follow-Up Form    4/5/2023    Quit Date:     Clinical Status of Patient: Outpatient    Length of Service: 30 minutes    Continuing Medication: yes  Patches or Nicotine Lozenges    Other Medications:      Target Symptoms: Withdrawal and medication side effects. The following were  rated moderate (3) to severe (4) on TCRS:  Moderate (3): none  Severe (4): none    Comments: 14-15 cig/day, down from a pack/day; pt remains on prescribed tobacco cessation medication regimen of 14mg nicotine patch, along with 2mg nicotine lozenge (about 3/day), daily as directed; pt has successfully eliminated the before breakfast cigarette, and is no longer smoking in the car; pt continues to work on set goal to only keep 8-9 cig in her current pack and to also get back to keeping accurate daily count; pt will continue to keep bottle of lozenges next to pack of cigarettes to allow for making the choice to smoke or choose the lozenge; Pt feels like she is really craving nicotine more, has also starting smoking a bit more; pt has been dealing with a lot more stressors at work and also getting ready to leave home for a month for 's transplant surgery coming up beginning of May; commended pt on progress this far and discussed next steps in quit plan      Diagnosis: F17.210    Next Visit: 2 weeks    The patient location is: [place], [place]  The chief complaint leading to consultation is: Nicotine dependence     Visit type: audiovisual     Face to Face time with patient: 18  28 minutes of total time spent on the encounter, which includes face to face time and non-face to face time preparing to see the patient (eg, review of tests), Obtaining and/or reviewing separately obtained history, Documenting clinical information in the electronic or other health record, Independently interpreting results (not separately reported) and communicating results to the patient/family/caregiver, or Care coordination (not separately reported).             Each patient to whom he or she provides medical services by telemedicine is:  (1) informed of the relationship between the physician and patient and the respective role of any other health care provider with respect to management of the patient; and (2) notified that he or she may decline to receive medical services by telemedicine and may withdraw from such care at any time.

## 2023-04-05 NOTE — Clinical Note
14-15 cig/day, down from a pack/day; pt remains on prescribed tobacco cessation medication regimen of 14mg nicotine patch, along with 2mg nicotine lozenge (about 3/day), daily as directed; pt has successfully eliminated the before breakfast cigarette, and is no longer smoking in the car; pt continues to work on set goal to only keep 8-9 cig in her current pack and to also get back to keeping accurate daily count; pt will continue to keep bottle of lozenges next to pack of cigarettes to allow for making the choice to smoke or choose the lozenge; Pt feels like she is really craving nicotine more, has also starting smoking a bit more; pt has been dealing with a lot more stressors at work and also getting ready to leave home for a month for 's transplant surgery coming up beginning of May; commended pt on progress this far and discussed next steps in quit plan

## 2023-04-06 ENCOUNTER — TELEPHONE (OUTPATIENT)
Dept: SMOKING CESSATION | Facility: CLINIC | Age: 53
End: 2023-04-06
Payer: COMMERCIAL

## 2023-04-19 ENCOUNTER — CLINICAL SUPPORT (OUTPATIENT)
Dept: SMOKING CESSATION | Facility: CLINIC | Age: 53
End: 2023-04-19

## 2023-04-19 DIAGNOSIS — F17.210 MODERATE CIGARETTE SMOKER (10-19 PER DAY): Primary | ICD-10-CM

## 2023-04-19 DIAGNOSIS — F17.210 MODERATE SMOKER (20 OR LESS PER DAY): ICD-10-CM

## 2023-04-19 PROCEDURE — 99402 PREV MED CNSL INDIV APPRX 30: CPT | Mod: 95,,,

## 2023-04-19 PROCEDURE — 99402 PR PREVENT COUNSEL,INDIV,30 MIN: ICD-10-PCS | Mod: 95,,,

## 2023-04-19 RX ORDER — IBUPROFEN 200 MG
1 TABLET ORAL DAILY
Qty: 14 PATCH | Refills: 0 | Status: SHIPPED | OUTPATIENT
Start: 2023-04-19 | End: 2023-10-11

## 2023-04-19 RX ORDER — IBUPROFEN 200 MG
1 TABLET ORAL DAILY
Qty: 28 PATCH | Refills: 0 | Status: SHIPPED | OUTPATIENT
Start: 2023-04-19 | End: 2023-04-19 | Stop reason: SDUPTHER

## 2023-04-19 RX ORDER — IBUPROFEN 200 MG
1 TABLET ORAL DAILY
Qty: 14 PATCH | Refills: 0 | Status: SHIPPED | OUTPATIENT
Start: 2023-04-19 | End: 2023-05-02 | Stop reason: SDUPTHER

## 2023-04-19 NOTE — PROGRESS NOTES
Individual Follow-Up Form    4/19/2023    Quit Date:     Clinical Status of Patient: Outpatient    Length of Service: 30 minutes    Continuing Medication: yes  Patches or Nicotine Lozenges    Other Medications:      Target Symptoms: Withdrawal and medication side effects. The following were  rated moderate (3) to severe (4) on TCRS:  Moderate (3): none  Severe (4): none    Comments: 10-15 cig/day, down from a pack/day; pt remains on prescribed tobacco cessation medication regimen of 21mg nicotine patch, along with 2mg nicotine lozenge (about 3/day), daily as directed; pt had a very good first week getting down to 10 cig/day; a little bit tougher week the second week and smoked an average of 12-15 cig/day; pt continues to work on set goal to only keep 8-9 cig in her current pack and to also get back to keeping accurate daily count; pt will continue to keep bottle of lozenges next to pack of cigarettes to allow for making the choice to smoke or choose the lozenge; pt has been dealing with a lot more stressors at work and also getting ready to leave home for a month for 's transplant surgery coming up beginning of May, discussed maintaining focus and motivation with everything going on; commended pt on progress this far and discussed next steps in quit plan        Diagnosis: F17.210    Next Visit: 2 weeks    [facility] [place]  The chief complaint leading to consultation is: Nicotine dependence     Visit type: audiovisual     Face to Face time with patient: 18  28 minutes of total time spent on the encounter, which includes face to face time and non-face to face time preparing to see the patient (eg, review of tests), Obtaining and/or reviewing separately obtained history, Documenting clinical information in the electronic or other health record, Independently interpreting results (not separately reported) and communicating results to the patient/family/caregiver, or Care coordination (not  separately reported).            Each patient to whom he or she provides medical services by telemedicine is:  (1) informed of the relationship between the physician and patient and the respective role of any other health care provider with respect to management of the patient; and (2) notified that he or she may decline to receive medical services by telemedicine and may withdraw from such care at any time.

## 2023-04-19 NOTE — Clinical Note
10-15 cig/day, down from a pack/day; pt remains on prescribed tobacco cessation medication regimen of 21mg nicotine patch, along with 2mg nicotine lozenge (about 3/day), daily as directed; pt had a very good first week getting down to 10 cig/day; a little bit tougher week the second week and smoked an average of 12-15 cig/day; pt continues to work on set goal to only keep 8-9 cig in her current pack and to also get back to keeping accurate daily count; pt will continue to keep bottle of lozenges next to pack of cigarettes to allow for making the choice to smoke or choose the lozenge; pt has been dealing with a lot more stressors at work and also getting ready to leave home for a month for 's transplant surgery coming up beginning of May, discussed maintaining focus and motivation with everything going on; commended pt on progress this far and discussed next steps in quit plan

## 2023-04-21 ENCOUNTER — PATIENT MESSAGE (OUTPATIENT)
Dept: SMOKING CESSATION | Facility: CLINIC | Age: 53
End: 2023-04-21
Payer: COMMERCIAL

## 2023-04-26 ENCOUNTER — PATIENT MESSAGE (OUTPATIENT)
Dept: SMOKING CESSATION | Facility: CLINIC | Age: 53
End: 2023-04-26
Payer: COMMERCIAL

## 2023-04-27 ENCOUNTER — PATIENT MESSAGE (OUTPATIENT)
Dept: SMOKING CESSATION | Facility: CLINIC | Age: 53
End: 2023-04-27
Payer: COMMERCIAL

## 2023-04-27 DIAGNOSIS — F17.210 MODERATE SMOKER (20 OR LESS PER DAY): ICD-10-CM

## 2023-04-27 RX ORDER — IBUPROFEN 200 MG
TABLET ORAL
Refills: 0 | OUTPATIENT
Start: 2023-04-27

## 2023-05-02 ENCOUNTER — TELEPHONE (OUTPATIENT)
Dept: SMOKING CESSATION | Facility: CLINIC | Age: 53
End: 2023-05-02
Payer: COMMERCIAL

## 2023-05-02 DIAGNOSIS — F17.210 MODERATE SMOKER (20 OR LESS PER DAY): ICD-10-CM

## 2023-05-02 RX ORDER — IBUPROFEN 200 MG
1 TABLET ORAL DAILY
Qty: 84 PATCH | Refills: 0 | Status: SHIPPED | OUTPATIENT
Start: 2023-05-02

## 2023-05-02 NOTE — TELEPHONE ENCOUNTER
Pts pharmacy/insurance offered her plan to provide 3-month supply of nicotine patches; [facility]

## 2023-05-03 ENCOUNTER — PATIENT MESSAGE (OUTPATIENT)
Dept: SMOKING CESSATION | Facility: CLINIC | Age: 53
End: 2023-05-03
Payer: COMMERCIAL

## 2023-07-24 ENCOUNTER — TELEPHONE (OUTPATIENT)
Dept: SMOKING CESSATION | Facility: CLINIC | Age: 53
End: 2023-07-24
Payer: COMMERCIAL

## 2023-07-28 ENCOUNTER — PATIENT MESSAGE (OUTPATIENT)
Dept: OBSTETRICS AND GYNECOLOGY | Facility: CLINIC | Age: 53
End: 2023-07-28
Payer: COMMERCIAL

## 2023-08-14 ENCOUNTER — OFFICE VISIT (OUTPATIENT)
Dept: OBSTETRICS AND GYNECOLOGY | Facility: CLINIC | Age: 53
End: 2023-08-14
Payer: COMMERCIAL

## 2023-08-14 ENCOUNTER — PROCEDURE VISIT (OUTPATIENT)
Dept: OBSTETRICS AND GYNECOLOGY | Facility: CLINIC | Age: 53
End: 2023-08-14
Payer: COMMERCIAL

## 2023-08-14 VITALS
SYSTOLIC BLOOD PRESSURE: 143 MMHG | DIASTOLIC BLOOD PRESSURE: 81 MMHG | HEART RATE: 83 BPM | WEIGHT: 137.19 LBS | BODY MASS INDEX: 24.3 KG/M2

## 2023-08-14 DIAGNOSIS — N95.0 PMB (POSTMENOPAUSAL BLEEDING): Primary | ICD-10-CM

## 2023-08-14 DIAGNOSIS — N95.0 POSTMENOPAUSAL BLEEDING: Primary | ICD-10-CM

## 2023-08-14 DIAGNOSIS — N95.0 POSTMENOPAUSAL BLEEDING: ICD-10-CM

## 2023-08-14 PROCEDURE — 76830 TRANSVAGINAL US NON-OB: CPT | Mod: S$GLB,,, | Performed by: OBSTETRICS & GYNECOLOGY

## 2023-08-14 PROCEDURE — 76830 US OB/GYN PROCEDURE (VIEWPOINT): ICD-10-PCS | Mod: S$GLB,,, | Performed by: OBSTETRICS & GYNECOLOGY

## 2023-08-14 PROCEDURE — 99213 OFFICE O/P EST LOW 20 MIN: CPT | Mod: 25,S$GLB,, | Performed by: OBSTETRICS & GYNECOLOGY

## 2023-08-14 PROCEDURE — 99213 PR OFFICE/OUTPT VISIT, EST, LEVL III, 20-29 MIN: ICD-10-PCS | Mod: 25,S$GLB,, | Performed by: OBSTETRICS & GYNECOLOGY

## 2023-08-14 NOTE — PROGRESS NOTES
Answers submitted by the patient for this visit:  Vaginal Bleeding Questionnaire  (Submitted on 2023)  Chief Complaint: Vaginal bleeding  Onset: 1 to 4 weeks ago  Frequency: intermittently  Progression since onset: waxing and waning  Pain severity: no pain  Affected side: both  Pregnant now?: No  abdominal pain: No  anorexia: Yes  back pain: Yes  chills: No  constipation: Yes  diarrhea: Yes  discolored urine: No  dysuria: No  fever: No  flank pain: No  frequency: Yes  headaches: Yes  hematuria: No  nausea: No  painful intercourse: No  rash: No  urgency: Yes  vomiting: No  Vaginal bleeding: spotting  Passing clots?: No  Passing tissue?: No  Aggravated by: nothing  treatments tried: nothing  Improvement on treatment: no relief  Sexual activity: sexually active  Partner with STD symptoms: no  Menstrual history: postmenopausal  STD: Yes  abdominal surgery: No   section: No  Ectopic pregnancy: No  Endometriosis: Yes  herpes simplex: No  gynecological surgery: Yes  menorrhagia: Yes  metrorrhagia: No  miscarriage: Yes  ovarian cysts: Yes  perineal abscess: No  PID: Yes  terminated pregnancy: Yes

## 2023-08-14 NOTE — PROGRESS NOTES
Subjective:      Patient ID: Kassandra Crisostomo is a 53 y.o. female.    Chief Complaint:  Vaginal Bleeding (Pt has been spotting for 10-11 days at a time with a 4day break in between. )      History of Present Illness  Vaginal Bleeding  The current episode started 1 to 4 weeks ago. The problem occurs intermittently. The problem has been waxing and waning. The patient is experiencing no pain. The problem affects both sides. She is not pregnant. Associated symptoms include anorexia, back pain, constipation, diarrhea, frequency, headaches and urgency. Pertinent negatives include no abdominal pain, chills, discolored urine, dysuria, fever, flank pain, hematuria, nausea, painful intercourse, rash or vomiting. The vaginal bleeding is spotting. She has not been passing clots. She has not been passing tissue. Nothing aggravates the symptoms. She has tried nothing for the symptoms. The treatment provided no relief. She is sexually active. No, her partner does not have an STD. She is postmenopausal. Her past medical history is significant for endometriosis, a gynecological surgery, menorrhagia, miscarriage, ovarian cysts, PID, an STD and a terminated pregnancy. There is no history of an abdominal surgery, a  section, an ectopic pregnancy, herpes simplex, metrorrhagia or perineal abscess.     Patient with bleeding on and off since . Has been going in a pattern and has not stopped.    GYN & OB History  No LMP recorded. Patient is postmenopausal.   Date of Last Pap: No result found    OB History    Para Term  AB Living   4 3           SAB IAB Ectopic Multiple Live Births                  # Outcome Date GA Lbr Jin/2nd Weight Sex Delivery Anes PTL Lv   4             3 Para            2 Para            1 Para                Review of Systems  Review of Systems   Constitutional:  Negative for chills and fever.   Gastrointestinal:  Positive for anorexia, constipation and diarrhea. Negative for abdominal  pain, nausea and vomiting.   Genitourinary:  Positive for frequency, menorrhagia, urgency and vaginal bleeding. Negative for dysuria, flank pain and hematuria.   Musculoskeletal:  Positive for back pain.   Integumentary:  Negative for rash.   Neurological:  Positive for headaches.          Objective:     Physical Exam:   Constitutional: She is oriented to person, place, and time. Vital signs are normal. She appears well-developed and well-nourished. She is cooperative.      Neck: No thyroid mass and no thyromegaly present.    Cardiovascular:  Normal rate and normal pulses.             Pulmonary/Chest: Effort normal. No respiratory distress.        Abdominal: Soft. She exhibits no distension. There is no abdominal tenderness. No hernia.             Musculoskeletal: Moves all extremeties.       Neurological: She is alert and oriented to person, place, and time.    Skin: Skin is warm and dry. No rash noted.    Psychiatric: She has a normal mood and affect. Her speech is normal and behavior is normal. Judgment and thought content normal.         Assessment:     No diagnosis found.   Postmenopausal Bleeding.         Plan:     Having constipation and then every now and then will have loose stools.   Takes progesterone every night.   Patches are changed every Saturday and Wednesday.      Bleeding has only started since the hormones, never had this prior to hormones.   Plan on progesterone for 2 weeks and then off two weeks.     Plan on USG  Addendum:  Called patient to notify that lining was between 4 and 5 mm.  Patient will discontinue progesterone we will plan for a follow-up ultrasound at next visit.  Patient reports she does not mind a hysteroscopy D&C but would like to be sure that this is needed as she is on hormone replacement therapy.  Feel this is reasonable.

## 2023-08-31 RX ORDER — PROGESTERONE 100 MG/1
100 CAPSULE ORAL NIGHTLY
Qty: 90 CAPSULE | Refills: 3 | Status: SHIPPED | OUTPATIENT
Start: 2023-08-31 | End: 2023-10-11

## 2023-09-13 DIAGNOSIS — Z12.31 SCREENING MAMMOGRAM FOR BREAST CANCER: Primary | ICD-10-CM

## 2023-09-14 ENCOUNTER — OFFICE VISIT (OUTPATIENT)
Dept: OBSTETRICS AND GYNECOLOGY | Facility: CLINIC | Age: 53
End: 2023-09-14
Payer: COMMERCIAL

## 2023-09-14 ENCOUNTER — PROCEDURE VISIT (OUTPATIENT)
Dept: OBSTETRICS AND GYNECOLOGY | Facility: CLINIC | Age: 53
End: 2023-09-14
Payer: COMMERCIAL

## 2023-09-14 VITALS
HEART RATE: 87 BPM | SYSTOLIC BLOOD PRESSURE: 139 MMHG | HEIGHT: 63 IN | DIASTOLIC BLOOD PRESSURE: 81 MMHG | BODY MASS INDEX: 24.63 KG/M2 | WEIGHT: 139 LBS

## 2023-09-14 DIAGNOSIS — N88.8 CERVICAL MASS: ICD-10-CM

## 2023-09-14 DIAGNOSIS — Z01.419 ROUTINE GYNECOLOGICAL EXAMINATION: Primary | ICD-10-CM

## 2023-09-14 DIAGNOSIS — N95.0 POSTMENOPAUSAL BLEEDING: ICD-10-CM

## 2023-09-14 PROCEDURE — 99396 PREV VISIT EST AGE 40-64: CPT | Mod: 25,S$GLB,, | Performed by: OBSTETRICS & GYNECOLOGY

## 2023-09-14 PROCEDURE — 76830 US OB/GYN PROCEDURE (VIEWPOINT): ICD-10-PCS | Mod: S$GLB,,, | Performed by: OBSTETRICS & GYNECOLOGY

## 2023-09-14 PROCEDURE — 76830 TRANSVAGINAL US NON-OB: CPT | Mod: S$GLB,,, | Performed by: OBSTETRICS & GYNECOLOGY

## 2023-09-14 PROCEDURE — 99396 PR PREVENTIVE VISIT,EST,40-64: ICD-10-PCS | Mod: 25,S$GLB,, | Performed by: OBSTETRICS & GYNECOLOGY

## 2023-09-14 RX ORDER — ALPRAZOLAM 0.25 MG/1
TABLET ORAL
COMMUNITY
Start: 2023-06-30

## 2023-09-14 RX ORDER — METHOCARBAMOL 500 MG/1
500 TABLET, FILM COATED ORAL 2 TIMES DAILY PRN
COMMUNITY
Start: 2023-05-30 | End: 2023-10-11

## 2023-09-14 RX ORDER — NICOTINE 7MG/24HR
PATCH, TRANSDERMAL 24 HOURS TRANSDERMAL
COMMUNITY
Start: 2023-05-03 | End: 2023-10-11

## 2023-09-14 RX ORDER — QUETIAPINE FUMARATE 50 MG/1
TABLET, FILM COATED ORAL
COMMUNITY
Start: 2010-01-01

## 2023-09-14 RX ORDER — DEXTROAMPHETAMINE SACCHARATE, AMPHETAMINE ASPARTATE, DEXTROAMPHETAMINE SULFATE AND AMPHETAMINE SULFATE 2.5; 2.5; 2.5; 2.5 MG/1; MG/1; MG/1; MG/1
TABLET ORAL
COMMUNITY
Start: 2023-07-28 | End: 2023-10-11

## 2023-09-14 RX ORDER — LISDEXAMFETAMINE DIMESYLATE 10 MG/1
1 CAPSULE ORAL
COMMUNITY
Start: 2023-07-17 | End: 2023-10-11

## 2023-09-14 RX ORDER — IBUPROFEN 800 MG/1
800 TABLET ORAL EVERY 6 HOURS PRN
COMMUNITY
Start: 2023-05-31 | End: 2023-10-11

## 2023-09-14 NOTE — PROGRESS NOTES
Subjective:      Patient ID: Kassandra Crisostomo is a 53 y.o. female.    Chief Complaint:  Well Woman      History of Present Illness  HPI  Patient with bleeding that began in July. Presents today for an USG and annual. Report more cramping and bloating in lower abdomen.     GYN & OB History  No LMP recorded. Patient is postmenopausal.   Date of Last Pap: No result found    OB History    Para Term  AB Living   4 3           SAB IAB Ectopic Multiple Live Births                  # Outcome Date GA Lbr Jin/2nd Weight Sex Delivery Anes PTL Lv   4             3 Para            2 Para            1 Para                Review of Systems  Review of Systems   Constitutional:  Negative for activity change, appetite change, fatigue, fever and unexpected weight change.   HENT:  Negative for nasal congestion and tinnitus.    Eyes:  Negative for visual disturbance.   Respiratory:  Negative for cough and shortness of breath.    Cardiovascular:  Negative for chest pain and leg swelling.   Gastrointestinal:  Negative for abdominal pain, bloating, blood in stool, constipation and diarrhea.   Genitourinary:  Positive for pelvic pain and vaginal bleeding. Negative for bladder incontinence, dysuria, vaginal discharge, vaginal pain, vaginal dryness and vaginal odor.   Musculoskeletal:  Negative for arthralgias, back pain and joint swelling.   Integumentary:  Negative for acne.   Neurological:  Negative for headaches.   Psychiatric/Behavioral:  Negative for depression and sleep disturbance. The patient is not nervous/anxious.           Objective:     Physical Exam:   Constitutional: She is oriented to person, place, and time. Vital signs are normal. She appears well-developed and well-nourished. She is cooperative.      Neck: No thyroid mass and no thyromegaly present.    Cardiovascular:  Normal rate, regular rhythm and normal pulses.             Pulmonary/Chest: Effort normal. No respiratory distress. Chest wall is not dull  to percussion. She exhibits no mass, no bony tenderness, no laceration, no crepitus, no edema, no deformity, no swelling and no retraction. Right breast exhibits no inverted nipple, no mass, no nipple discharge, no skin change, no tenderness, presence, no bleeding and no swelling. Left breast exhibits no inverted nipple, no mass, no nipple discharge, no skin change, no tenderness, presence, no bleeding and no swelling.        Abdominal: Soft. She exhibits no distension. There is no abdominal tenderness. No hernia.     Genitourinary:    Vagina, uterus and rectum normal.      Pelvic exam was performed with patient supine.   Labial bartholins normal.There is no rash, tenderness, lesion or injury on the right labia. There is no rash, tenderness, lesion or injury on the left labia. Cervix is normal. Right adnexum displays no mass, no tenderness and no fullness. Left adnexum displays no mass, no tenderness and no fullness. No  no vaginal discharge, rectocele, cystocele or unspecified prolapse of vaginal walls in the vagina.           Musculoskeletal: Moves all extremeties.       Neurological: She is alert and oriented to person, place, and time.    Skin: Skin is warm and dry. No rash noted.    Psychiatric: She has a normal mood and affect. Her speech is normal and behavior is normal. Judgment and thought content normal.         Assessment:     1. Routine gynecological examination       Cervical mass, postmenopausal bleeding        Plan:     Cervical mass which may be enlarged nabothian cyst vs fibroid. Patient counseled on TLH/BSO given symptoms and agrees to plan of care.

## 2023-09-15 ENCOUNTER — PATIENT MESSAGE (OUTPATIENT)
Dept: OBSTETRICS AND GYNECOLOGY | Facility: CLINIC | Age: 53
End: 2023-09-15
Payer: COMMERCIAL

## 2023-09-20 LAB — Lab: NORMAL

## 2023-09-27 ENCOUNTER — PATIENT MESSAGE (OUTPATIENT)
Dept: OBSTETRICS AND GYNECOLOGY | Facility: CLINIC | Age: 53
End: 2023-09-27
Payer: COMMERCIAL

## 2023-10-10 ENCOUNTER — PATIENT MESSAGE (OUTPATIENT)
Dept: OBSTETRICS AND GYNECOLOGY | Facility: CLINIC | Age: 53
End: 2023-10-10
Payer: COMMERCIAL

## 2023-10-11 ENCOUNTER — OFFICE VISIT (OUTPATIENT)
Dept: OBSTETRICS AND GYNECOLOGY | Facility: CLINIC | Age: 53
End: 2023-10-11
Payer: COMMERCIAL

## 2023-10-11 VITALS
DIASTOLIC BLOOD PRESSURE: 82 MMHG | HEART RATE: 87 BPM | BODY MASS INDEX: 23.92 KG/M2 | HEIGHT: 63 IN | WEIGHT: 135 LBS | SYSTOLIC BLOOD PRESSURE: 138 MMHG

## 2023-10-11 DIAGNOSIS — N88.8 CERVICAL MASS: Primary | ICD-10-CM

## 2023-10-11 DIAGNOSIS — N95.0 POSTMENOPAUSAL BLEEDING: ICD-10-CM

## 2023-10-11 PROCEDURE — 99499 NO LOS: ICD-10-PCS | Mod: S$GLB,,, | Performed by: OBSTETRICS & GYNECOLOGY

## 2023-10-11 PROCEDURE — 99499 UNLISTED E&M SERVICE: CPT | Mod: S$GLB,,, | Performed by: OBSTETRICS & GYNECOLOGY

## 2023-10-11 RX ORDER — DOCUSATE SODIUM 100 MG/1
100 CAPSULE, LIQUID FILLED ORAL DAILY PRN
Qty: 30 CAPSULE | Refills: 1 | Status: SHIPPED | OUTPATIENT
Start: 2023-10-11 | End: 2024-10-10

## 2023-10-11 RX ORDER — IBUPROFEN 800 MG/1
800 TABLET ORAL EVERY 8 HOURS PRN
Qty: 60 TABLET | Refills: 2 | Status: SHIPPED | OUTPATIENT
Start: 2023-10-11 | End: 2024-10-10

## 2023-10-11 RX ORDER — DOCUSATE SODIUM 100 MG/1
100 CAPSULE, LIQUID FILLED ORAL DAILY PRN
Qty: 30 CAPSULE | Refills: 1 | Status: SHIPPED | OUTPATIENT
Start: 2023-10-11 | End: 2023-10-11 | Stop reason: SDUPTHER

## 2023-10-11 RX ORDER — IBUPROFEN 800 MG/1
800 TABLET ORAL EVERY 8 HOURS PRN
Qty: 60 TABLET | Refills: 2 | Status: SHIPPED | OUTPATIENT
Start: 2023-10-11 | End: 2023-10-11 | Stop reason: SDUPTHER

## 2023-10-11 RX ORDER — OXYCODONE AND ACETAMINOPHEN 5; 325 MG/1; MG/1
1 TABLET ORAL EVERY 4 HOURS PRN
Qty: 15 TABLET | Refills: 0 | Status: SHIPPED | OUTPATIENT
Start: 2023-10-11 | End: 2023-10-18

## 2023-10-11 RX ORDER — OXYCODONE AND ACETAMINOPHEN 5; 325 MG/1; MG/1
1 TABLET ORAL EVERY 4 HOURS PRN
Qty: 15 TABLET | Refills: 0 | Status: SHIPPED | OUTPATIENT
Start: 2023-10-11 | End: 2023-10-11 | Stop reason: SDUPTHER

## 2023-10-11 NOTE — PROGRESS NOTES
p  Subjective:      Patient ID: Kassandra Crisostomo is a 53 y.o. female.    Chief Complaint:  Pre-op Exam      History of Present Illness  HPI  Patient with a cervical mass on ultrasound.  Patient was counseled on diagnostic procedure versus definitive procedure.  Patient elected for total laparoscopic hysterectomy, bilateral salpingoophorectomy and cystoscopy.  Patient presents today for preoperative visit.  She is present here today with her .    GYN & OB History  No LMP recorded. Patient is postmenopausal.   Date of Last Pap: No result found    OB History    Para Term  AB Living   4 3           SAB IAB Ectopic Multiple Live Births                  # Outcome Date GA Lbr Jin/2nd Weight Sex Delivery Anes PTL Lv   4             3 Para            2 Para            1 Para                Review of Systems  Review of Systems   Constitutional:  Negative for activity change, appetite change, chills, diaphoresis, fatigue, fever and unexpected weight change.   Respiratory:  Negative for cough and shortness of breath.    Cardiovascular:  Negative for chest pain, palpitations and leg swelling.   Gastrointestinal:  Negative for abdominal pain, bloating, constipation and diarrhea.   Genitourinary:  Positive for vaginal bleeding. Negative for vaginal discharge, vaginal pain, vaginal dryness and vaginal odor.   Musculoskeletal:  Negative for back pain and joint swelling.   Integumentary:  Negative for rash and acne.   Psychiatric/Behavioral:  Negative for depression. The patient is not nervous/anxious.           Objective:     Physical Exam:   Constitutional: She is oriented to person, place, and time. Vital signs are normal. She appears well-developed and well-nourished. She is cooperative.      Neck: No thyroid mass and no thyromegaly present.    Cardiovascular:  Normal rate and normal pulses.             Pulmonary/Chest: Effort normal. No respiratory distress.        Abdominal: Soft. She exhibits no  distension. There is no abdominal tenderness. No hernia.             Musculoskeletal: Moves all extremeties.       Neurological: She is alert and oriented to person, place, and time.    Skin: Skin is warm and dry. No rash noted.    Psychiatric: She has a normal mood and affect. Her speech is normal and behavior is normal. Judgment and thought content normal.         Assessment:     1. Cervical mass       Postmenopausal bleeding        Plan:     A diagram was drawn for the patient and her  and the risks of the procedure were discussed in detail..  All questions were answered.  Consents were signed.  Will proceed with total laparoscopic hysterectomy with bilateral salpingo-oophorectomy and cervical biopsy if accessible.

## 2023-10-17 ENCOUNTER — OUTSIDE PLACE OF SERVICE (OUTPATIENT)
Dept: OBSTETRICS AND GYNECOLOGY | Facility: CLINIC | Age: 53
End: 2023-10-17

## 2023-10-17 PROCEDURE — 58571 PR LAPAROSCOPY W TOT HYSTERECTUTERUS <=250 GRAM  W TUBE/OVARY: ICD-10-PCS | Mod: ,,, | Performed by: OBSTETRICS & GYNECOLOGY

## 2023-10-17 PROCEDURE — 58571 TLH W/T/O 250 G OR LESS: CPT | Mod: ,,, | Performed by: OBSTETRICS & GYNECOLOGY

## 2023-10-19 NOTE — PROGRESS NOTES
Patient notified of benign pathology. Having some swelling on right side. No nausea. No increase in pain. Soreness and slight swelling on right labia. Patient reassured.

## 2023-10-23 ENCOUNTER — TELEPHONE (OUTPATIENT)
Dept: OBSTETRICS AND GYNECOLOGY | Facility: CLINIC | Age: 53
End: 2023-10-23
Payer: COMMERCIAL

## 2023-10-27 ENCOUNTER — PATIENT MESSAGE (OUTPATIENT)
Dept: OBSTETRICS AND GYNECOLOGY | Facility: CLINIC | Age: 53
End: 2023-10-27
Payer: COMMERCIAL

## 2023-10-31 ENCOUNTER — OFFICE VISIT (OUTPATIENT)
Dept: OBSTETRICS AND GYNECOLOGY | Facility: CLINIC | Age: 53
End: 2023-10-31
Payer: COMMERCIAL

## 2023-10-31 VITALS
SYSTOLIC BLOOD PRESSURE: 148 MMHG | BODY MASS INDEX: 25.43 KG/M2 | DIASTOLIC BLOOD PRESSURE: 86 MMHG | HEART RATE: 84 BPM | HEIGHT: 62 IN | WEIGHT: 138.19 LBS

## 2023-10-31 DIAGNOSIS — Z48.89 POSTOPERATIVE VISIT: Primary | ICD-10-CM

## 2023-10-31 PROCEDURE — 99024 PR POST-OP FOLLOW-UP VISIT: ICD-10-PCS | Mod: S$GLB,,, | Performed by: OBSTETRICS & GYNECOLOGY

## 2023-10-31 PROCEDURE — 99024 POSTOP FOLLOW-UP VISIT: CPT | Mod: S$GLB,,, | Performed by: OBSTETRICS & GYNECOLOGY

## 2023-11-01 NOTE — PROGRESS NOTES
Subjective:      Patient ID: Kassandra Crisostomo is a 53 y.o. female.    Chief Complaint:  Post-op Evaluation (Scant bleeding / )      History of Present Illness  HPI  S/p TLH/BSO for cervical mass. Ambulating, tolerating PO, abdominal pain minimal.    GYN & OB History  No LMP recorded. Patient is postmenopausal.   Date of Last Pap: No result found    OB History    Para Term  AB Living   4 3           SAB IAB Ectopic Multiple Live Births                  # Outcome Date GA Lbr Jin/2nd Weight Sex Delivery Anes PTL Lv   4             3 Para            2 Para            1 Para                Review of Systems  Review of Systems       Objective:     Physical Exam    Trocar incisions healing well. Tenderness at umbilical incision. Monocryl removed from lateral incisions.  Assessment:     No diagnosis found.      Postoperative visit     Plan:      f/u one week

## 2023-11-08 ENCOUNTER — OFFICE VISIT (OUTPATIENT)
Dept: OBSTETRICS AND GYNECOLOGY | Facility: CLINIC | Age: 53
End: 2023-11-08
Payer: COMMERCIAL

## 2023-11-08 ENCOUNTER — PATIENT MESSAGE (OUTPATIENT)
Dept: OBSTETRICS AND GYNECOLOGY | Facility: CLINIC | Age: 53
End: 2023-11-08

## 2023-11-08 VITALS
WEIGHT: 135.81 LBS | HEIGHT: 62 IN | DIASTOLIC BLOOD PRESSURE: 89 MMHG | SYSTOLIC BLOOD PRESSURE: 161 MMHG | HEART RATE: 80 BPM | BODY MASS INDEX: 24.99 KG/M2

## 2023-11-08 DIAGNOSIS — R23.9 SKIN CHANGE: ICD-10-CM

## 2023-11-08 DIAGNOSIS — Z78.0 MENOPAUSE: ICD-10-CM

## 2023-11-08 DIAGNOSIS — Z48.89 POSTOPERATIVE VISIT: Primary | ICD-10-CM

## 2023-11-08 PROCEDURE — 99024 POSTOP FOLLOW-UP VISIT: CPT | Mod: S$GLB,,, | Performed by: OBSTETRICS & GYNECOLOGY

## 2023-11-08 PROCEDURE — 99024 PR POST-OP FOLLOW-UP VISIT: ICD-10-PCS | Mod: S$GLB,,, | Performed by: OBSTETRICS & GYNECOLOGY

## 2023-11-08 RX ORDER — FEZOLINETANT 45 MG/1
45 TABLET, FILM COATED ORAL DAILY
Qty: 90 TABLET | Refills: 3 | Status: SHIPPED | OUTPATIENT
Start: 2023-11-08 | End: 2023-11-08 | Stop reason: SDUPTHER

## 2023-11-08 RX ORDER — FEZOLINETANT 45 MG/1
45 TABLET, FILM COATED ORAL DAILY
Qty: 90 TABLET | Refills: 3 | Status: SHIPPED | OUTPATIENT
Start: 2023-11-08 | End: 2024-11-07

## 2023-11-08 NOTE — PROGRESS NOTES
Subjective:      Patient ID: Kassandra Crisostomo is a 53 y.o. female.    Chief Complaint:  Post-op Evaluation (Wants to know when she can bathe, and drive/Also wants to discuss hot flashes medication/Still seeing scant blood when wiping/)      History of Present Illness  HPI  Patient reports she feels much better.     GYN & OB History  No LMP recorded. Patient is postmenopausal.   Date of Last Pap: No result found    OB History    Para Term  AB Living   4 3           SAB IAB Ectopic Multiple Live Births                  # Outcome Date GA Lbr Jin/2nd Weight Sex Delivery Anes PTL Lv   4             3 Para            2 Para            1 Para                Review of Systems  Review of Systems   Constitutional:  Negative for activity change and unexpected weight change.   Respiratory:  Negative for cough and shortness of breath.    Cardiovascular:  Negative for chest pain, palpitations and leg swelling.   Gastrointestinal:  Negative for abdominal pain, bloating, constipation and diarrhea.   Genitourinary:  Positive for vaginal discharge. Negative for vaginal bleeding, vaginal pain, vaginal dryness and vaginal odor.   Musculoskeletal:  Negative for back pain and joint swelling.   Integumentary:  Negative for rash and acne.   Psychiatric/Behavioral:  Negative for depression. The patient is not nervous/anxious.         Objective:     Physical Exam:   Constitutional: She is oriented to person, place, and time. Vital signs are normal. She appears well-developed and well-nourished. She is cooperative.      Neck: No thyroid mass and no thyromegaly present.    Cardiovascular:  Normal rate and normal pulses.             Pulmonary/Chest: Effort normal. No respiratory distress.        Abdominal: Soft. She exhibits no distension. There is no abdominal tenderness. No hernia.   Trocar incisions well healed             Musculoskeletal: Moves all extremeties.       Neurological: She is alert and oriented to person,  place, and time.    Skin: Skin is warm and dry. No rash noted.        3 mm skin change noticed. Area cleansed with alcohol and numbed with lidocaine. Scalpel used to resect tissue. Interrupted sutures placed with 3-0 vicryl rapide. Patient tolerated well.    Psychiatric: She has a normal mood and affect. Her speech is normal and behavior is normal. Judgment and thought content normal.         Assessment:     Postoperative visit       Plan:     Patient healing well.   Biopsy taken of skin change.

## 2023-11-10 ENCOUNTER — TELEPHONE (OUTPATIENT)
Dept: OBSTETRICS AND GYNECOLOGY | Facility: CLINIC | Age: 53
End: 2023-11-10
Payer: COMMERCIAL

## 2023-11-10 ENCOUNTER — PATIENT MESSAGE (OUTPATIENT)
Dept: OBSTETRICS AND GYNECOLOGY | Facility: CLINIC | Age: 53
End: 2023-11-10
Payer: COMMERCIAL

## 2023-11-10 NOTE — TELEPHONE ENCOUNTER
----- Message from Alissa Reji sent at 11/10/2023 11:28 AM CST -----  Contact: Christi Beckford  .Type:  Pharmacy Calling to Clarify an RX    Name of Caller: Analy  Pharmacy Name: [facility]   Prescription Name: fezolinetant (VEOZAH) 45 mg Tab  What do they need to clarify?: Pt plan does not cover but will cover alternate medication named Estradiol tablet   Best Call Back Number: [phone]  Additional Information:  Ref [id number]    Analy reports pharmacy will only hold until the end of the day

## 2023-11-10 NOTE — TELEPHONE ENCOUNTER
[facility]. We canceled the prescription and sent to [facility] for it to be filled.

## 2023-11-13 ENCOUNTER — TELEPHONE (OUTPATIENT)
Dept: OBSTETRICS AND GYNECOLOGY | Facility: CLINIC | Age: 53
End: 2023-11-13
Payer: COMMERCIAL

## 2023-11-13 NOTE — TELEPHONE ENCOUNTER
Called to inform patient her results were normal, patient acknowledged and understood.       Devora MILLER

## 2023-12-12 ENCOUNTER — OFFICE VISIT (OUTPATIENT)
Dept: OBSTETRICS AND GYNECOLOGY | Facility: CLINIC | Age: 53
End: 2023-12-12
Payer: COMMERCIAL

## 2023-12-12 VITALS
WEIGHT: 135.19 LBS | SYSTOLIC BLOOD PRESSURE: 158 MMHG | HEIGHT: 62 IN | DIASTOLIC BLOOD PRESSURE: 86 MMHG | HEART RATE: 90 BPM | BODY MASS INDEX: 24.88 KG/M2

## 2023-12-12 DIAGNOSIS — Z48.89 POSTOPERATIVE VISIT: Primary | ICD-10-CM

## 2023-12-12 PROCEDURE — 99024 POSTOP FOLLOW-UP VISIT: CPT | Mod: S$GLB,,, | Performed by: OBSTETRICS & GYNECOLOGY

## 2023-12-12 PROCEDURE — 99024 PR POST-OP FOLLOW-UP VISIT: ICD-10-PCS | Mod: S$GLB,,, | Performed by: OBSTETRICS & GYNECOLOGY

## 2023-12-12 NOTE — PROGRESS NOTES
Subjective:       Patient ID: Kassandra Crisostomo is a 53 y.o. female.    Chief Complaint:  Post-op Evaluation      History of Present Illness  She is doing well.  No new health issues,  No abnormal bleeding, No GI or Gu concerns  Status post TLH      GYN & OB History  No LMP recorded. Patient is postmenopausal.     Date of Last Pap: No result found    OB History    Para Term  AB Living   4 3           SAB IAB Ectopic Multiple Live Births                  # Outcome Date GA Lbr Jin/2nd Weight Sex Delivery Anes PTL Lv   4             3 Para            2 Para            1 Para                Review of Systems  Review of Systems          Objective:    Physical Exam     Cuff well healed  Assessment:      No diagnosis found.   Postoperative Visit          Plan:             Pap discussed will return for her annual     Pt is aware we call all results. If she does not hear from our office regarding her result within a week of having a study or procedure performed she is to call the office so that we can research the result for her.  Normal activities discussed.   She is to return for any reason.

## 2024-01-03 ENCOUNTER — CLINICAL SUPPORT (OUTPATIENT)
Dept: SMOKING CESSATION | Facility: CLINIC | Age: 54
End: 2024-01-03

## 2024-01-03 DIAGNOSIS — F17.200 NICOTINE DEPENDENCE: Primary | ICD-10-CM

## 2024-01-03 PROCEDURE — 99999 PR PBB SHADOW E&M-EST. PATIENT-LVL I: CPT | Mod: PBBFAC,,,

## 2024-01-03 NOTE — PROGRESS NOTES
Spoke with patient today in regard to smoking cessation progress 3/6/12 month telephone follow up, she states that she is not tobacco free.  Patient states she trimmed down to 12 cigarettes daily.  Commended patient on the accomplishments thus far.  Informed patient of benefit period, future follow up, and contact information if any further help or support is needed.  Will complete smart form for 3/6/12 month follow up on Quit attempt #1 and resolve episode.

## 2025-01-31 DIAGNOSIS — I73.9 PAD (PERIPHERAL ARTERY DISEASE): Primary | ICD-10-CM

## 2025-02-13 NOTE — PROGRESS NOTES
"    [facility] Vascular - Clinic Note  Kian Rosenberg MD      Patient Name: Kassandra Crisostomo                   : 1970      MRN: [id number]   Visit Date: 2025       History Present Illness     Reason for Visit: Arterial Disease    Ms. Crisostomo is a pleasant 54 year old female referred over by Dr. Park for right lower extremity claudication. She has history of right common iliac high grade stenosis and previously seen Dr. Montero in  however did not want to proceed with anything at that time. She is only able to ambulate 50 feet before needing to stop. This has gotten worse over the past few years. She does have some back trouble and is in physical therapy that is improving this. She denies any rest pain or wounds to her feet. Denies any symptoms to her left leg. She smokes about 1 pack per day. She is on asa and statin.  She was seen by Dr. Montero in .  He felt that based off imaging she would be better for an aortoiliac endarterectomy.  I was able to review his notes.  However the imaging center where she had her CT done does not have any of the old images to evaluate.      REVIEW OF SYSTEMS:  12 point review of systems conducted, negative except as stated in the history of present illness. See HPI for details.        Physical Exam      Vitals:    25 1020 25 1022   BP: 138/82 121/79   BP Location: Left arm Right arm   Patient Position: Sitting Sitting   Pulse: 80 77   Weight: 57.2 kg (126 lb)    Height: 5' 2" (1.575 m)           General: well-nourished, no acute distress, and healthy appearing, alert, pleasant, conversant, and oriented  Neurologic: cranial nerves are grossly intact, no neurologic deficits, no motor deficits, and no sensory deficits  Neck/Chest: normal , soft without lymphadenopathy, and no carotid bruits noted  Respiratory: breathing easily, without respiratory distress, and normal breath sounds  Abdomen: normal and soft  Cardiology: regular rate and rhythm and no audible " murmur    Upper Extremity Arterial Exam:   Right - radial is palpable and brachial is palpable  Left - radial is palpable and brachial is palpable    Lower Extremity Arterial Exam:  Right - femoral is non-palpable and dorsalis pedis is non-palpable  Left - femoral is palpable and dorsalis pedis is palpable    Musculoskeletal:   Upper Extremity: normal bilateral hand function  Lower Extremity: no edema present to bilateral lower extremities     Skin: has no obvious skin abnormality to lower extremities          Assessment and Plan     Ms. Crisostomo is a 54 y.o. male with extremity a written claudication and aortoiliac disease.  We discussed smoking cessation and tips to quit smoking.  I did offer her initially to try exercise therapy with Pletal to see if she gets benefit from this to avoid any type of intervention.  I did do ABIs on her today to get a baseline.  I spoke to Dr. Park her cardiologist in [place] as the supervised exercise therapy at the local hospital can only be referred to by physicians on staff.  He agreed to refer her for this.  I will have her follow up in about 3 months to evaluate her response to exercise therapy.  We will also call in  Pletal at this time.        1. 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  Dispense: 180 tablet; Refill: 3    2. PAD (peripheral artery disease)  - Ambulatory referral/consult to Vascular Surgery    3. Smoker          Imaging Obtained/Reviewed   Study: CTA aorta/runoff  Date:   10/14/2022        Medical History     Past Medical History:   Diagnosis Date    Atherosclerosis of native arteries of extremities with intermittent claudication, unspecified extremity     Breast disorder 09/07/2021    Depression     Emphysema lung     Gestational diabetes     HLD (hyperlipidemia)     Hormone disorder      Hypertension     Insomnia     Occlusion of left iliac artery     PAD (peripheral artery disease)      Past Surgical History:   Procedure Laterality Date    APPENDECTOMY      HYSTERECTOMY  2023    OOPHORECTOMY Bilateral     RETINAL DETACHMENT SURGERY Bilateral 1991    SALPINGECTOMY Bilateral 2014     Family History   Problem Relation Name Age of Onset    Heart disease Mother      Diabetes Mellitus Mother       Social History     Socioeconomic History    Marital status:    Tobacco Use    Smoking status: Every Day     Current packs/day: 0.90     Average packs/day: 0.9 packs/day for 36.0 years (32.4 ttl pk-yrs)     Types: Cigarettes    Smokeless tobacco: Never   Substance and Sexual Activity    Alcohol use: Yes    Drug use: Never    Sexual activity: Yes     Partners: Male     Birth control/protection: See Surgical Hx     Current Outpatient Medications   Medication Instructions    budesonide/glycopyr/formoterol (BREZTRI AEROSPHERE INHL) No dose, route, or frequency recorded.    calcium carb/vit D3/minerals (CALCIUM-VITAMIN D ORAL) Take by mouth.    carvediloL (COREG) 6.25 mg, Oral, 2 times daily with meals    cilostazoL (PLETAL) 50 mg, Oral, 2 times daily    dextroamphetamine-amphetamine (ADDERALL) 15 mg tablet Take by mouth. 1/2 tablet BID    hydrOXYzine (ATARAX) 50 mg, Every 6 hours PRN    lisinopriL-hydrochlorothiazide (PRINZIDE,ZESTORETIC) 20-12.5 mg per tablet 2 tablets, Daily    QUEtiapine (SEROQUEL) 50 MG tablet No dose, route, or frequency recorded.    rosuvastatin (CRESTOR) 10 mg, Nightly     Review of patient's allergies indicates:   Allergen Reactions    Sulfa (sulfonamide antibiotics)        Patient Care Team:  Sallie Walden MD as PCP - General (Family Medicine)  Kian Rosenberg MD as Vascular Surgery (Vascular Surgery)  Ernesto Toro MD as Consulting Physician (Cardiology)        No follow-ups on file. In addition to their scheduled follow up, the patient has also been instructed to  follow up on as needed basis.     Future Appointments   Date Time Provider Department Center   5/21/2025  1:00 PM Kian Rosenberg MD [facility] [place]

## 2025-02-17 ENCOUNTER — OFFICE VISIT (OUTPATIENT)
Dept: VASCULAR SURGERY | Facility: CLINIC | Age: 55
End: 2025-02-17
Payer: COMMERCIAL

## 2025-02-17 VITALS
DIASTOLIC BLOOD PRESSURE: 79 MMHG | HEIGHT: 62 IN | SYSTOLIC BLOOD PRESSURE: 121 MMHG | WEIGHT: 126 LBS | BODY MASS INDEX: 23.19 KG/M2 | HEART RATE: 77 BPM

## 2025-02-17 DIAGNOSIS — F17.200 SMOKER: ICD-10-CM

## 2025-02-17 DIAGNOSIS — I73.9 PAD (PERIPHERAL ARTERY DISEASE): ICD-10-CM

## 2025-02-17 DIAGNOSIS — I70.211 ATHEROSCLER OF NATIVE ARTERY OF RIGHT LEG WITH INTERMIT CLAUDICATION: Primary | ICD-10-CM

## 2025-02-17 PROCEDURE — 99204 OFFICE O/P NEW MOD 45 MIN: CPT | Mod: ,,, | Performed by: SURGERY

## 2025-02-17 RX ORDER — CYCLOBENZAPRINE HCL 5 MG
5 TABLET ORAL
COMMUNITY
Start: 2024-12-19 | End: 2025-02-17

## 2025-02-17 RX ORDER — PREDNISONE 20 MG/1
20 TABLET ORAL
COMMUNITY
Start: 2024-08-27 | End: 2025-02-17

## 2025-02-17 RX ORDER — ROSUVASTATIN CALCIUM 10 MG/1
10 TABLET, COATED ORAL NIGHTLY
COMMUNITY
Start: 2025-02-04

## 2025-02-17 RX ORDER — CARVEDILOL 6.25 MG/1
6.25 TABLET ORAL 2 TIMES DAILY WITH MEALS
COMMUNITY
Start: 2024-12-01

## 2025-02-17 RX ORDER — HYDROXYZINE HYDROCHLORIDE 50 MG/1
50 TABLET, FILM COATED ORAL EVERY 6 HOURS PRN
COMMUNITY
Start: 2024-11-03

## 2025-02-17 RX ORDER — CLONIDINE HYDROCHLORIDE 0.1 MG/1
0.1 TABLET ORAL 2 TIMES DAILY
COMMUNITY
Start: 2024-11-03 | End: 2025-02-17

## 2025-02-17 RX ORDER — CILOSTAZOL 50 MG/1
50 TABLET ORAL 2 TIMES DAILY
Qty: 180 TABLET | Refills: 3 | Status: SHIPPED | OUTPATIENT
Start: 2025-02-17 | End: 2026-02-17

## 2025-02-17 RX ORDER — HYDROCODONE BITARTRATE AND ACETAMINOPHEN 5; 325 MG/1; MG/1
1 TABLET ORAL
COMMUNITY
Start: 2024-12-19 | End: 2025-02-17

## 2025-04-29 RX ORDER — CILOSTAZOL 50 MG/1
50 TABLET ORAL 2 TIMES DAILY
Qty: 60 TABLET | Refills: 11 | Status: SHIPPED | OUTPATIENT
Start: 2025-04-29 | End: 2026-04-29